# Patient Record
Sex: FEMALE | Race: WHITE | NOT HISPANIC OR LATINO | Employment: UNEMPLOYED | URBAN - METROPOLITAN AREA
[De-identification: names, ages, dates, MRNs, and addresses within clinical notes are randomized per-mention and may not be internally consistent; named-entity substitution may affect disease eponyms.]

---

## 2024-01-01 ENCOUNTER — HOSPITAL ENCOUNTER (EMERGENCY)
Facility: HOSPITAL | Age: 64
End: 2024-01-20
Attending: EMERGENCY MEDICINE
Payer: MEDICARE

## 2024-01-01 VITALS — HEIGHT: 55 IN | WEIGHT: 106.69 LBS | BODY MASS INDEX: 24.69 KG/M2

## 2024-01-01 DIAGNOSIS — A41.9 SEPSIS, DUE TO UNSPECIFIED ORGANISM, UNSPECIFIED WHETHER ACUTE ORGAN DYSFUNCTION PRESENT: ICD-10-CM

## 2024-01-01 DIAGNOSIS — I46.9 CARDIAC ARREST: Primary | ICD-10-CM

## 2024-01-01 LAB
ALBUMIN SERPL BCP-MCNC: 1.6 G/DL (ref 3.5–5.2)
ALP SERPL-CCNC: 134 U/L (ref 55–135)
ALT SERPL W/O P-5'-P-CCNC: 53 U/L (ref 10–44)
ANION GAP SERPL CALC-SCNC: 21 MMOL/L (ref 8–16)
ANISOCYTOSIS BLD QL SMEAR: SLIGHT
AST SERPL-CCNC: 142 U/L (ref 10–40)
BASOPHILS # BLD AUTO: ABNORMAL K/UL (ref 0–0.2)
BASOPHILS NFR BLD: 0 % (ref 0–1.9)
BILIRUB SERPL-MCNC: 0.2 MG/DL (ref 0.1–1)
BNP SERPL-MCNC: 136 PG/ML (ref 0–99)
BUN SERPL-MCNC: 19 MG/DL (ref 8–23)
BURR CELLS BLD QL SMEAR: ABNORMAL
CALCIUM SERPL-MCNC: 7.8 MG/DL (ref 8.7–10.5)
CHLORIDE SERPL-SCNC: 111 MMOL/L (ref 95–110)
CO2 SERPL-SCNC: 14 MMOL/L (ref 23–29)
CREAT SERPL-MCNC: 1.3 MG/DL (ref 0.5–1.4)
DIFFERENTIAL METHOD BLD: ABNORMAL
EOSINOPHIL # BLD AUTO: ABNORMAL K/UL (ref 0–0.5)
EOSINOPHIL NFR BLD: 2 % (ref 0–8)
ERYTHROCYTE [DISTWIDTH] IN BLOOD BY AUTOMATED COUNT: 18.7 % (ref 11.5–14.5)
EST. GFR  (NO RACE VARIABLE): 45.9 ML/MIN/1.73 M^2
GLUCOSE SERPL-MCNC: 247 MG/DL (ref 70–110)
HCT VFR BLD AUTO: 28.6 % (ref 37–48.5)
HGB BLD-MCNC: 7.9 G/DL (ref 12–16)
IMM GRANULOCYTES # BLD AUTO: ABNORMAL K/UL (ref 0–0.04)
IMM GRANULOCYTES NFR BLD AUTO: ABNORMAL % (ref 0–0.5)
LACTATE SERPL-SCNC: >12 MMOL/L (ref 0.5–2.2)
LYMPHOCYTES # BLD AUTO: ABNORMAL K/UL (ref 1–4.8)
LYMPHOCYTES NFR BLD: 85 % (ref 18–48)
MCH RBC QN AUTO: 24.9 PG (ref 27–31)
MCHC RBC AUTO-ENTMCNC: 27.6 G/DL (ref 32–36)
MCV RBC AUTO: 90 FL (ref 82–98)
MONOCYTES # BLD AUTO: ABNORMAL K/UL (ref 0.3–1)
MONOCYTES NFR BLD: 3 % (ref 4–15)
MYELOCYTES NFR BLD MANUAL: 1 %
NEUTROPHILS NFR BLD: 6 % (ref 38–73)
NEUTS BAND NFR BLD MANUAL: 3 %
NRBC BLD-RTO: 0 /100 WBC
OVALOCYTES BLD QL SMEAR: ABNORMAL
PLATELET # BLD AUTO: 133 K/UL (ref 150–450)
PMV BLD AUTO: 11 FL (ref 9.2–12.9)
POCT GLUCOSE: 246 MG/DL (ref 70–110)
POIKILOCYTOSIS BLD QL SMEAR: SLIGHT
POTASSIUM SERPL-SCNC: 4.9 MMOL/L (ref 3.5–5.1)
PROT SERPL-MCNC: 4 G/DL (ref 6–8.4)
RBC # BLD AUTO: 3.17 M/UL (ref 4–5.4)
SMUDGE CELLS BLD QL SMEAR: PRESENT
SODIUM SERPL-SCNC: 146 MMOL/L (ref 136–145)
TROPONIN I SERPL DL<=0.01 NG/ML-MCNC: 0.01 NG/ML (ref 0–0.03)
WBC # BLD AUTO: 15.97 K/UL (ref 3.9–12.7)

## 2024-01-01 PROCEDURE — 85060 BLOOD SMEAR INTERPRETATION: CPT | Mod: ,,, | Performed by: PATHOLOGY

## 2024-01-01 PROCEDURE — 96374 THER/PROPH/DIAG INJ IV PUSH: CPT | Mod: ER

## 2024-01-01 PROCEDURE — 83880 ASSAY OF NATRIURETIC PEPTIDE: CPT | Mod: ER | Performed by: EMERGENCY MEDICINE

## 2024-01-01 PROCEDURE — 94002 VENT MGMT INPAT INIT DAY: CPT | Mod: ER

## 2024-01-01 PROCEDURE — 85027 COMPLETE CBC AUTOMATED: CPT | Performed by: EMERGENCY MEDICINE

## 2024-01-01 PROCEDURE — 82962 GLUCOSE BLOOD TEST: CPT | Mod: ER

## 2024-01-01 PROCEDURE — 85027 COMPLETE CBC AUTOMATED: CPT | Mod: ER | Performed by: EMERGENCY MEDICINE

## 2024-01-01 PROCEDURE — 80053 COMPREHEN METABOLIC PANEL: CPT | Mod: ER | Performed by: EMERGENCY MEDICINE

## 2024-01-01 PROCEDURE — 99291 CRITICAL CARE FIRST HOUR: CPT | Mod: ER

## 2024-01-01 PROCEDURE — 31500 INSERT EMERGENCY AIRWAY: CPT | Mod: ER

## 2024-01-01 PROCEDURE — 85007 BL SMEAR W/DIFF WBC COUNT: CPT | Performed by: EMERGENCY MEDICINE

## 2024-01-01 PROCEDURE — 63600175 PHARM REV CODE 636 W HCPCS: Mod: ER | Performed by: EMERGENCY MEDICINE

## 2024-01-01 PROCEDURE — 83605 ASSAY OF LACTIC ACID: CPT | Mod: ER | Performed by: EMERGENCY MEDICINE

## 2024-01-01 PROCEDURE — 84484 ASSAY OF TROPONIN QUANT: CPT | Mod: ER | Performed by: EMERGENCY MEDICINE

## 2024-01-01 PROCEDURE — 85007 BL SMEAR W/DIFF WBC COUNT: CPT | Mod: ER | Performed by: EMERGENCY MEDICINE

## 2024-01-01 PROCEDURE — 25000003 PHARM REV CODE 250: Mod: ER | Performed by: EMERGENCY MEDICINE

## 2024-01-01 RX ORDER — SODIUM BICARBONATE 1 MEQ/ML
SYRINGE (ML) INTRAVENOUS CODE/TRAUMA/SEDATION MEDICATION
Status: COMPLETED | OUTPATIENT
Start: 2024-01-01 | End: 2024-01-01

## 2024-01-01 RX ORDER — NOREPINEPHRINE BITARTRATE/D5W 4MG/250ML
0-3 PLASTIC BAG, INJECTION (ML) INTRAVENOUS CONTINUOUS
Status: DISCONTINUED | OUTPATIENT
Start: 2024-01-01 | End: 2024-01-01 | Stop reason: HOSPADM

## 2024-01-01 RX ORDER — EPINEPHRINE 0.1 MG/ML
INJECTION INTRAVENOUS CODE/TRAUMA/SEDATION MEDICATION
Status: COMPLETED | OUTPATIENT
Start: 2024-01-01 | End: 2024-01-01

## 2024-01-01 RX ORDER — ATROPINE SULFATE 0.1 MG/ML
INJECTION INTRAVENOUS CODE/TRAUMA/SEDATION MEDICATION
Status: COMPLETED | OUTPATIENT
Start: 2024-01-01 | End: 2024-01-01

## 2024-01-01 RX ADMIN — EPINEPHRINE 1 MG: 0.1 INJECTION INTRACARDIAC; INTRAVENOUS at 10:01

## 2024-01-01 RX ADMIN — ATROPINE SULFATE 0.5 MG: 0.1 INJECTION INTRAVENOUS at 10:01

## 2024-01-01 RX ADMIN — SODIUM BICARBONATE 100 MEQ: 84 INJECTION INTRAVENOUS at 10:01

## 2024-01-20 NOTE — ED NOTES
Spoke with Afua at [facility], pt is not a candidate for tissue nor eye donation.

## 2024-01-20 NOTE — ED PROVIDER NOTES
Encounter Date: 1/20/2024       History   No chief complaint on file.    HPI  Pt presents from [facility] Respiratory Arrest, began after an episode of vomiting. EMS bagging patient enroute.    Review of patient's allergies indicates:  Not on File  No past medical history on file.  No past surgical history on file.  No family history on file.     Review of Systems   Unable to perform ROS: Acuity of condition   Gastrointestinal:  Positive for vomiting.       Physical Exam     Initial Vitals   BP Pulse Resp Temp SpO2   -- -- -- -- --      MAP       --         Physical Exam    Nursing note and vitals reviewed.  Constitutional: She appears well-developed and well-nourished. She appears distressed.   HENT:   Head: Normocephalic and atraumatic.   Mouth/Throat: Oropharynx is clear and moist.   Neg GAG   Eyes:   Pupils fixed, dilated, No corneal reflex   Neck: Neck supple.   Normal range of motion.  Cardiovascular:            No Cardiac activity auscultated   Pulmonary/Chest: She is in respiratory distress.   No spontaneous resp  Bilateral BS c BVM   Abdominal: Abdomen is soft. Bowel sounds are normal. She exhibits no distension. There is no abdominal tenderness.   Musculoskeletal:      Cervical back: Normal range of motion and neck supple.      Comments: PICC LUE     Neurological:   GCS 3 Unresponsive   Skin: Skin is warm and dry. No erythema. There is pallor.   Psychiatric: She has a normal mood and affect. Thought content normal.     No organized cardiac activity on BS US    ED Course   Critical Care    Date/Time: 1/20/2024 11:04 AM    Performed by: Nathan Jackson MD  Authorized by: Nathan Jackson MD  Direct patient critical care time: 15 minutes  Additional history critical care time: 5 minutes  Ordering / reviewing critical care time: 5 minutes  Documentation critical care time: 12 minutes  Consult with family critical care time: 4 minutes  Total critical care time (exclusive of procedural  time) : 41 minutes  Critical care time was exclusive of separately billable procedures and treating other patients.  Critical care was necessary to treat or prevent imminent or life-threatening deterioration of the following conditions: cardiac failure, circulatory failure and shock.  Critical care was time spent personally by me on the following activities: blood draw for specimens, development of treatment plan with patient or surrogate, evaluation of patient's response to treatment, examination of patient, obtaining history from patient or surrogate, ordering and performing treatments and interventions, ordering and review of laboratory studies, ordering and review of radiographic studies, pulse oximetry and re-evaluation of patient's condition.      Intubation    Date/Time: 1/20/2024 11:08 AM  Location procedure was performed: [facility] EMERGENCY DEPARTMENT    Performed by: Nathan Jackson MD  Authorized by: Nathan Jackson MD  Consent Done: Emergent Situation  Indications: respiratory failure  Intubation method: direct  Patient status: unconscious  Preoxygenation: BVM  Paralytic: none  Laryngoscope size: Mac 4  Tube size: 7.5 mm  Tube type: cuffed  Number of attempts: 2  Ventilation between attempts: BVM  Cricoid pressure: yes  Cords visualized: yes  Post-procedure assessment: CO2 detector and chest rise  Breath sounds: equal  Cuff inflated: yes  ETT to lip: 24 cm  Tube secured with: ETT jerome  Chest x-ray interpreted by me.  Chest x-ray findings: endotracheal tube in appropriate position  Patient tolerance: Patient tolerated the procedure well with no immediate complications  Complications: No        Labs Reviewed   CBC W/ AUTO DIFFERENTIAL - Abnormal; Notable for the following components:       Result Value    WBC 15.97 (*)     RBC 3.17 (*)     Hemoglobin 7.9 (*)     Hematocrit 28.6 (*)     MCH 24.9 (*)     MCHC 27.6 (*)     RDW 18.7 (*)     Platelets 133 (*)     All other components within  normal limits   POCT GLUCOSE - Abnormal; Notable for the following components:    POCT Glucose 246 (*)     All other components within normal limits   CULTURE, BLOOD   CULTURE, BLOOD   COMPREHENSIVE METABOLIC PANEL   LACTIC ACID, PLASMA   LACTIC ACID, PLASMA   URINALYSIS, REFLEX TO URINE CULTURE   TROPONIN I   B-TYPE NATRIURETIC PEPTIDE          Imaging Results              X-Ray Chest AP Portable (Final result)  Result time 24 11:18:51      Final result by Tony Pearson MD (24 11:18:51)                   Impression:      Right mainstem bronchus intubation.  Recommend repositioning the endotracheal tube.  Left perihilar low-grade infiltrates.      Electronically signed by: Tony Pearson MD  Date:    2024  Time:    11:18               Narrative:    EXAMINATION:  XR CHEST AP PORTABLE    CLINICAL HISTORY:  Sepsis;    TECHNIQUE:  AP view of the chest was performed.    COMPARISON:  None    FINDINGS:  The cardiac and mediastinal silhouettes appear within normal limits.   Left perihilar mixed interstitial and ground-glass infiltrates.  Endotracheal tube in the right mainstem bronchus.  Left-sided PICC in the SVC.  No acute osseous findings demonstrated.                                  Additional Hx obtained from [facility]- pt was Bacteremic, on ABX since 24 through PICC    ACLS protocol initiated Pt intubated and 2 rounds of epi/biacrb given. Tube repositioned after viewing CXR. Pt with ROSC momentarily. 2 Shocks provided for vfib as well as additional epi s success. Bedside US no cardiac activity.    Time of death 11:02 AM    Discussed c family pt status- .     Medications   NORepinephrine 4 mg in dextrose 5% 250 mL infusion (premix) (has no administration in time range)   sodium chloride 0.9% bolus 1,000 mL 1,000 mL (has no administration in time range)     Medical Decision Making  Problems Addressed:  Cardiac arrest: acute illness or injury that poses a threat to life or bodily functions    Amount  and/or Complexity of Data Reviewed  Labs: ordered.     Details: H/H 7.9/28.6  Glu 246  Radiology: ordered and independent interpretation performed.     Details: Agree c radiology- tube repositioned    Risk  Prescription drug management.    Critical Care  Total time providing critical care: 41 minutes                                      Clinical Impression:  Final diagnoses:  [I46.9] Cardiac arrest (Primary)  [A41.9] Sepsis, due to unspecified organism, unspecified whether acute organ dysfunction present          ED Disposition Condition                     Nathan Jackson MD  24 1120

## 2024-01-20 NOTE — ED NOTES
Patient in ED via [facility] from [facility] home.     Per [facility], after vomiting patient was found bradycardic and apneic per NH Staff.  Patient paced along with manual bag mask ventilation in progeress.     Upon arrival in the ED patient found to be unresponsive, pulseless, and pale, with fixed & dilated pupils.     Several rounds of ACLS Protocol initiated.  Time of death 11:02 hours per Dr. Garcia ([facility]).  See ACLS Protocol details below:   10:33:  Asystole with compressions initiated   10:34:  1 mg IV Epinephrine administered   10:35:  ROSC achieved   10:35:  Patient pulseless again / CHEST COMPRESSIONS RESUMED   10:37:  Pulse Check / No palpable pulse / Sodium Bicarbonate administered   10:38:  1 mg IV Epinephrine administered   10:39:  Patient intubated with 7.0 ETT, 24 cm at the gums   10:40:  Pulse Check / PEA / Compressions Resumed   10:42:  Pulse Check / ROSC Achieved   1050:   mg/dL   10:54:  0.5 mg IV Atropine administered for bradycardia (30s)   10:56:  1 mg IV Epinephrine administered   10:57:  Defibrillation with 150 Joules for V-Fib                 Chest Compressions Resumed   10:59:  Defibrillation with 150 Joules for V-Fib                Chest Compressions resumed   11:02  Pulse Check without ROSC                                                             TIME OF DEATH 11:02 HOURS PER DR. GARCIA (ED Medicine).

## 2024-01-20 NOTE — ED NOTES
1122 Dr. Jackson spoke with pts daughter gabriella to inform of pts death then I spoke with Gabriella and given number to [facility] ed for her to call back because does not know which  home that they will use. Alyssa RONQUILLO informed ,

## 2024-01-20 NOTE — ED NOTES
1120 Called [facility]sg home and spoke with pts nurse orlando [email or web address] called to pts room around 0945 and pt vomited and was in resp distress. Oxygen applied and [facility] called. He stated [facility] arrived at 1013. Orlando torrez states infomed pt daughter  Gabriella Lindsey [phone].

## 2024-01-20 NOTE — ED NOTES
Spoke with pts daughter and does not have name of  home. Ander with  office states will transport pt to [facility] and daughter can call  office on Monday at [phone] . Gabriella daughter aware and will call on Monday - aware mother will be transported to [facility]. Alyssa primary nurse notified.

## 2024-01-20 NOTE — ED NOTES
Pt arrived to ER [facility], bag-mask ventilation in progress. CPR began and patient intubated with 7.0 ETT secured 24cm at the gum. Pt placed on Vent with settings as noted on flowsheet. CPR and bag-mask ventilation began again due to loss of pulse. CPR remained in progress until code called by MD.

## 2024-01-22 LAB — PATH REV BLD -IMP: NORMAL
